# Patient Record
Sex: FEMALE | Race: WHITE | NOT HISPANIC OR LATINO | URBAN - METROPOLITAN AREA
[De-identification: names, ages, dates, MRNs, and addresses within clinical notes are randomized per-mention and may not be internally consistent; named-entity substitution may affect disease eponyms.]

---

## 2018-03-22 NOTE — PATIENT DISCUSSION
Recommended Flaxseed Oil 3,000 mg by mouth, warm compresses for 10 minutes once a day, and Systane Balance eye drops four times a day as needed.

## 2020-09-10 ENCOUNTER — IMPORTED ENCOUNTER (OUTPATIENT)
Dept: URBAN - METROPOLITAN AREA CLINIC 9 | Facility: CLINIC | Age: 70
End: 2020-09-10

## 2020-10-01 ENCOUNTER — IMPORTED ENCOUNTER (OUTPATIENT)
Dept: URBAN - METROPOLITAN AREA CLINIC 9 | Facility: CLINIC | Age: 70
End: 2020-10-01

## 2020-10-20 ENCOUNTER — IMPORTED ENCOUNTER (OUTPATIENT)
Dept: URBAN - METROPOLITAN AREA CLINIC 9 | Facility: CLINIC | Age: 70
End: 2020-10-20

## 2021-01-18 ENCOUNTER — IMPORTED ENCOUNTER (OUTPATIENT)
Dept: URBAN - METROPOLITAN AREA CLINIC 9 | Facility: CLINIC | Age: 71
End: 2021-01-18

## 2021-01-28 ENCOUNTER — IMPORTED ENCOUNTER (OUTPATIENT)
Dept: URBAN - METROPOLITAN AREA CLINIC 9 | Facility: CLINIC | Age: 71
End: 2021-01-28

## 2021-02-15 ENCOUNTER — IMPORTED ENCOUNTER (OUTPATIENT)
Dept: URBAN - METROPOLITAN AREA CLINIC 9 | Facility: CLINIC | Age: 71
End: 2021-02-15

## 2021-04-08 ENCOUNTER — IMPORTED ENCOUNTER (OUTPATIENT)
Dept: URBAN - METROPOLITAN AREA CLINIC 9 | Facility: CLINIC | Age: 71
End: 2021-04-08

## 2021-06-21 ENCOUNTER — IMPORTED ENCOUNTER (OUTPATIENT)
Dept: URBAN - METROPOLITAN AREA CLINIC 9 | Facility: CLINIC | Age: 71
End: 2021-06-21

## 2021-10-16 ASSESSMENT — TONOMETRY
OD_IOP_MMHG: 10
OS_IOP_MMHG: 11
OS_IOP_MMHG: 15
OS_IOP_MMHG: 13
OS_IOP_MMHG: 12
OD_IOP_MMHG: 13
OD_IOP_MMHG: 9
OS_IOP_MMHG: 14
OD_IOP_MMHG: 12
OD_IOP_MMHG: 11
OD_IOP_MMHG: 19
OS_IOP_MMHG: 16
OS_IOP_MMHG: 13
OD_IOP_MMHG: 13
OS_IOP_MMHG: 14
OD_IOP_MMHG: 12

## 2021-10-16 ASSESSMENT — KERATOMETRY
OS_K2POWER_DIOPTERS: 46.25
OD_AXISANGLE2_DEGREES: 9
OD_AXISANGLE_DEGREES: 87
OD_AXISANGLE2_DEGREES: 4
OS_AXISANGLE2_DEGREES: 6
OD_K2POWER_DIOPTERS: 46.5
OD_K1POWER_DIOPTERS: 45.75
OD_K2POWER_DIOPTERS: 46.5
OD_K1POWER_DIOPTERS: 45.5
OS_AXISANGLE2_DEGREES: 3
OS_K1POWER_DIOPTERS: 45.5
OS_AXISANGLE_DEGREES: 93
OS_K2POWER_DIOPTERS: 46.25
OS_K1POWER_DIOPTERS: 45.25
OD_AXISANGLE_DEGREES: 99
OD_AXISANGLE_DEGREES: 94
OS_AXISANGLE_DEGREES: 107
OD_AXISANGLE2_DEGREES: 177
OD_K2POWER_DIOPTERS: 46.5
OS_AXISANGLE2_DEGREES: 17
OS_AXISANGLE_DEGREES: 96
OS_K1POWER_DIOPTERS: 45.5
OS_K2POWER_DIOPTERS: 46
OD_K1POWER_DIOPTERS: 45.5

## 2021-10-16 ASSESSMENT — VISUAL ACUITY
OD_CC: 20/20 - SN
OS_CC: 20/20 - SN
OS_CC: 20/25 + SN
OD_SC: 20/40 SN
OS_SC: 20/30 + SN
OS_CC: 20/20 SN
OS_SC: 20/25 SN
OD_CC: 20/25 - SN
OD_SC: 20/40 SN
OS_SC: 20/60 SN
OD_SC: 20/40 SN
OS_SC: 20/30 SN
OD_SC: 20/40 SN
OS_CC: 20/30 SN
OS_CC: 20/20 SN
OS_SC: 20/30 SN
OD_CC: 20/25 SN
OD_CC: 20/25 SN
OD_CC: 20/30 SN
OS_CC: 20/25 SN
OS_SC: 20/25 - SN
OS_CC: 20/20 SN
OD_CC: 20/40 SN
OD_SC: 20/40 SN
OD_SC: 20/25 + SN
OD_SC: 20/40 SN

## 2021-12-06 ENCOUNTER — ESTABLISHED PATIENT (OUTPATIENT)
Dept: URBAN - METROPOLITAN AREA CLINIC 14 | Facility: CLINIC | Age: 71
End: 2021-12-06

## 2021-12-06 DIAGNOSIS — H26.493: ICD-10-CM

## 2021-12-06 PROCEDURE — 92014CEP ESTABLISHED PATIENT- EMPLOYEE ROUTINE COMP EXAM

## 2021-12-06 ASSESSMENT — VISUAL ACUITY
OS_SC: 20/30
OD_BCVA: 20/30
OS_BCVA: 20/20
OD_SC: 20/40

## 2021-12-06 ASSESSMENT — KERATOMETRY
OS_K2POWER_DIOPTERS: 46.25
OS_K1POWER_DIOPTERS: 45.5
OD_AXISANGLE_DEGREES: 94
OD_K1POWER_DIOPTERS: 45.75
OD_AXISANGLE2_DEGREES: 4
OD_K2POWER_DIOPTERS: 46.5
OS_AXISANGLE2_DEGREES: 17
OS_AXISANGLE_DEGREES: 107

## 2021-12-06 ASSESSMENT — TONOMETRY
OD_IOP_MMHG: 10
OS_IOP_MMHG: 11